# Patient Record
(demographics unavailable — no encounter records)

---

## 2025-02-12 NOTE — REVIEW OF SYSTEMS
[As Noted in HPI] : as noted in HPI [Negative] : Heme/Lymph [FreeTextEntry8] : Hx of prostate cancer s/p RT

## 2025-02-12 NOTE — PHYSICAL EXAM

## 2025-02-12 NOTE — HISTORY OF PRESENT ILLNESS
[FreeTextEntry1] : Patient is a 67-year-old gentleman with history of ulcerative colitis and radiation proctitis. The patient is doing well. His bowel movements are regular. He does not see any blood in the stool. He does not take suppositories any longer. He has been off Pentasa 2 tablets twice  a day and no further back pain. Takes Turmeric 2 tabs daily.  Patient had a colonoscopy on 12/10/2020.  This revealed quiescent colitis.  He did have evidence of radiation proctitis.  Biopsies did not reveal any dysplasia. Patient has gained 10 lbs that he is trying to lose.  5/17/2023-patient continues to do well off Pentasa.  He continues to take turmeric.  His bowel movements are regular.  He has no abdominal pain.  Patient has lost about 20 pounds.  He is exercising and at the gym 3 times a week.  He has changed his diet. He had a colonoscopy on 12/1/2022 that was normal except for evidence of mild radiation proctitis in the distal rectum.  11/8/2023-patient continues to do well as far as his ulcerative  colitis is concerned.  He is off Pentasa and only takes turmeric twice daily.  His bowel movements are regular. He does have a history of mild radiation proctitis.  His last colonoscopy was 12/2022. Patient is maintaining his weight.  He is continuing to exercise. He does complain of some left  5/8/2024-patient continues to do well off mesalamine.  His bowel movements are regular.  He occasionally sees some bright red blood on the paper and in the bowl but attributes this to his radiation proctitis.  He has no abdominal pain.  2/12/2025-Patient continues to do well as far as his ulcerative colitis is concerned.  His bowel movements are fairly regular.  He is off mesalamine.  He underwent a colonoscopy on 12/19/2024.  This revealed evidence of chronic quiescent ulcerative colitis.  Biopsies did not reveal any evidence of inflammation or dysplasia.  He had 2 small polyps that were removed that were hyperplastic polyps.